# Patient Record
Sex: MALE | Race: OTHER | Employment: FULL TIME | URBAN - METROPOLITAN AREA
[De-identification: names, ages, dates, MRNs, and addresses within clinical notes are randomized per-mention and may not be internally consistent; named-entity substitution may affect disease eponyms.]

---

## 2017-01-23 ENCOUNTER — HOSPITAL ENCOUNTER (EMERGENCY)
Age: 32
Discharge: HOME | End: 2017-01-23
Attending: EMERGENCY MEDICINE
Payer: SELF-PAY

## 2017-01-23 ENCOUNTER — APPOINTMENT (OUTPATIENT)
Dept: GENERAL RADIOLOGY | Age: 32
End: 2017-01-23
Attending: EMERGENCY MEDICINE
Payer: SELF-PAY

## 2017-01-23 VITALS
BODY MASS INDEX: 27.4 KG/M2 | HEART RATE: 82 BPM | TEMPERATURE: 98.5 F | DIASTOLIC BLOOD PRESSURE: 71 MMHG | SYSTOLIC BLOOD PRESSURE: 116 MMHG | OXYGEN SATURATION: 99 % | RESPIRATION RATE: 14 BRPM | HEIGHT: 69 IN | WEIGHT: 185 LBS

## 2017-01-23 DIAGNOSIS — M25.512 PAIN OF LEFT SHOULDER JOINT ON MOVEMENT: Primary | ICD-10-CM

## 2017-01-23 PROCEDURE — 73030 X-RAY EXAM OF SHOULDER: CPT

## 2017-01-23 PROCEDURE — 99283 EMERGENCY DEPT VISIT LOW MDM: CPT | Performed by: EMERGENCY MEDICINE

## 2017-01-23 RX ORDER — NAPROXEN SODIUM 550 MG/1
550 TABLET ORAL 2 TIMES DAILY WITH MEALS
Qty: 10 TAB | Refills: 0 | Status: SHIPPED | OUTPATIENT
Start: 2017-01-23

## 2017-01-23 NOTE — ED PROVIDER NOTES
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 Shoulder pain for 1-2 weeks, worse with movement and trying to raise up upwards. Patient is a 33 y.o. male presenting with shoulder pain. The history is provided by the patient. Shoulder Pain    The incident occurred more than 2 days ago. Incident location: while on vacation. There was no injury mechanism. The left shoulder is affected. The pain is moderate. The pain has been constant since onset. The pain does not radiate. There is no history of shoulder injury. He has no other injuries. There is no history of shoulder surgery. Pertinent negatives include no numbness, no muscle weakness and no tingling. History reviewed. No pertinent past medical history. Past Surgical History:   Procedure Laterality Date    Hx cholecystectomy           History reviewed. No pertinent family history. Social History     Social History    Marital status:      Spouse name: N/A    Number of children: N/A    Years of education: N/A     Occupational History    Not on file. Social History Main Topics    Smoking status: Current Every Day Smoker     Packs/day: 1.00     Years: 10.00    Smokeless tobacco: Not on file    Alcohol use Yes      Comment: maybe 3 times a year.  Drug use: No    Sexual activity: Not on file     Other Topics Concern    Not on file     Social History Narrative         ALLERGIES: Review of patient's allergies indicates no known allergies. Review of Systems   Constitutional: Negative for fever. Respiratory: Negative for cough and shortness of breath. Cardiovascular: Negative for chest pain and leg swelling. Gastrointestinal: Negative for nausea and vomiting. Neurological: Negative for tingling, weakness and numbness.        Vitals:    01/23/17 1522   BP: 119/72   Pulse: 86   Resp: 13   Temp: 98.5 °F (36.9 °C)   SpO2: 99%   Weight: 83.9 kg (185 lb)   Height: 5' 9\" (1.753 m) Physical Exam   Constitutional: He appears well-developed and well-nourished. Cardiovascular: Normal rate, regular rhythm and normal heart sounds. Pulmonary/Chest: Effort normal and breath sounds normal.   Musculoskeletal: He exhibits no edema, tenderness or deformity. No warmth or deformity noted. No erythema noted. Full range of motion intact although patient does have pain with external rotation and abduction. This occurs both passively and actively is worse with passive motion. Patient is neurovascularly intact. Nursing note and vitals reviewed. MDM  Number of Diagnoses or Management Options  Diagnosis management comments: Probable muscle strain or overuse injury versus rotator cuff injury or tear. X-ray to evaluate the bony anatomy. Rice therapy- rest ice and anti-inflammatory treatment. Follow-up with orthopedics in 2-4 weeks if not improving for further outpatient evaluation. Amount and/or Complexity of Data Reviewed  Tests in the radiology section of CPT®: ordered and reviewed (Xr Shoulder Lt Ap/lat Min 2 V    Result Date: 1/23/2017   XR SHOULDER LT AP/LAT MIN 2 V    1/23/2017 3:51 PM CLINICAL INFORMATION:  21-year-old with progressive left shoulder pain over the past 3 weeks, now severe with movement. Comparison: None available Findings: 3 views left shoulder. No acute fracture. Joint spaces are preserved. IMPRESSION: 1. No acute abnormality in the left shoulder.      )      ED Course       Procedures

## 2017-01-23 NOTE — ED TRIAGE NOTES
Pt in c/o left shoulder pain since taking trip to [place]. Pt states pain is worse with movement. Pt states he is unable to lift objects or turn the steering wheel while working due to pain.

## 2017-01-23 NOTE — LETTER
[facility] EMERGENCY DEPT 
[address] [place] [id number] [phone] Work/School Note Date: 1/23/2017 To Whom It May concern: 
 
Bree Chávez was seen and treated today in the emergency room by the following provider(s): 
Attending Provider: Farhana Vaca MD. Bree Chávez may return to work on 1/26/17.  
 
Sincerely, 
 
 
 
 
Jyoti Washington RN

## 2017-01-23 NOTE — DISCHARGE INSTRUCTIONS
Shoulder Pain: Care Instructions  Your Care Instructions    You can hurt your shoulder by using it too much during an activity, such as fishing or baseball. It can also happen as part of the everyday wear and tear of getting older. Shoulder injuries can be slow to heal, but your shoulder should get better with time. Your doctor may recommend a sling to rest your shoulder. If you have injured your shoulder, you may need testing and treatment. Follow-up care is a key part of your treatment and safety. Be sure to make and go to all appointments, and call your doctor if you are having problems. It's also a good idea to know your test results and keep a list of the medicines you take. How can you care for yourself at home? · Take pain medicines exactly as directed. ¨ If the doctor gave you a prescription medicine for pain, take it as prescribed. ¨ If you are not taking a prescription pain medicine, ask your doctor if you can take an over-the-counter medicine. ¨ Do not take two or more pain medicines at the same time unless the doctor told you to. Many pain medicines contain acetaminophen, which is Tylenol. Too much acetaminophen (Tylenol) can be harmful. · If your doctor recommends that you wear a sling, use it as directed. Do not take it off before your doctor tells you to. · Put ice or a cold pack on the sore area for 10 to 20 minutes at a time. Put a thin cloth between the ice and your skin. · If there is no swelling, you can put moist heat, a heating pad, or a warm cloth on your shoulder. Some doctors suggest alternating between hot and cold. · Rest your shoulder for a few days. If your doctor recommends it, you can then begin gentle exercise of the shoulder, but do not lift anything heavy. When should you call for help? Call 911 anytime you think you may need emergency care. For example, call if:  · You have chest pain or pressure. This may occur with:  ¨ Sweating. ¨ Shortness of breath.   ¨ Nausea or vomiting. ¨ Pain that spreads from the chest to the neck, jaw, or one or both shoulders or arms. ¨ Dizziness or lightheadedness. ¨ A fast or uneven pulse. After calling 911, chew 1 adult-strength aspirin. Wait for an ambulance. Do not try to drive yourself. · Your arm or hand is cool or pale or changes color. Call your doctor now or seek immediate medical care if:  · You have signs of infection, such as:  ¨ Increased pain, swelling, warmth, or redness in your shoulder. ¨ Red streaks leading from a place on your shoulder. ¨ Pus draining from an area of your shoulder. ¨ Swollen lymph nodes in your neck, armpits, or groin. ¨ A fever. Watch closely for changes in your health, and be sure to contact your doctor if:  · You cannot use your shoulder. · Your shoulder does not get better as expected. Where can you learn more? Go to http://alli-meredith.info/. Enter Z417 in the search box to learn more about \"Shoulder Pain: Care Instructions. \"  Current as of: May 23, 2016  Content Version: 11.1  © 0055-9763 [employer]. Care instructions adapted under license by [employer] (which disclaims liability or warranty for this information). If you have questions about a medical condition or this instruction, always ask your healthcare professional. [place] [address] any warranty or liability for your use of this information.